# Patient Record
Sex: MALE | Race: WHITE | URBAN - NONMETROPOLITAN AREA
[De-identification: names, ages, dates, MRNs, and addresses within clinical notes are randomized per-mention and may not be internally consistent; named-entity substitution may affect disease eponyms.]

---

## 2021-05-24 PROBLEM — K21.9 GERD (GASTROESOPHAGEAL REFLUX DISEASE): Status: ACTIVE | Noted: 2021-05-24

## 2021-05-24 PROBLEM — E66.811 OBESITY, CLASS I, BMI 30-34.9: Status: ACTIVE | Noted: 2019-12-06

## 2021-05-24 PROBLEM — K57.92 DIVERTICULITIS: Status: ACTIVE | Noted: 2019-11-12

## 2021-05-24 PROBLEM — I10 ESSENTIAL HYPERTENSION: Status: ACTIVE | Noted: 2021-05-24

## 2021-05-24 PROBLEM — G43.909 MIGRAINES: Status: ACTIVE | Noted: 2021-05-24

## 2021-05-24 PROBLEM — K57.32 SIGMOID DIVERTICULITIS: Status: ACTIVE | Noted: 2019-12-06

## 2021-05-24 PROBLEM — E66.9 OBESITY, CLASS I, BMI 30-34.9: Status: ACTIVE | Noted: 2019-12-06

## 2021-05-24 RX ORDER — MULTIVIT-MIN/FOLIC/VIT K/LYCOP 400-300MCG
1 TABLET ORAL DAILY
COMMUNITY

## 2021-05-24 RX ORDER — OMEPRAZOLE 40 MG/1
CAPSULE, DELAYED RELEASE ORAL PRN
COMMUNITY
Start: 2021-04-09 | End: 2021-07-14

## 2021-05-24 RX ORDER — ACETAMINOPHEN 160 MG
4000 TABLET,DISINTEGRATING ORAL
COMMUNITY

## 2021-05-24 RX ORDER — CETIRIZINE HYDROCHLORIDE 10 MG/1
TABLET ORAL
COMMUNITY
Start: 2021-04-22

## 2021-05-24 RX ORDER — LISINOPRIL 20 MG/1
1 TABLET ORAL DAILY
COMMUNITY
Start: 2021-04-30

## 2021-05-24 RX ORDER — FLUTICASONE PROPIONATE 50 MCG
SPRAY, SUSPENSION (ML) NASAL
COMMUNITY
Start: 2019-08-05

## 2021-05-24 RX ORDER — PROPRANOLOL HYDROCHLORIDE 120 MG/1
1 CAPSULE, EXTENDED RELEASE ORAL
COMMUNITY
Start: 2021-05-04

## 2021-05-25 ENCOUNTER — OFFICE VISIT (OUTPATIENT)
Dept: CARDIOLOGY CLINIC | Age: 61
End: 2021-05-25
Payer: COMMERCIAL

## 2021-05-25 VITALS
DIASTOLIC BLOOD PRESSURE: 78 MMHG | RESPIRATION RATE: 16 BRPM | SYSTOLIC BLOOD PRESSURE: 132 MMHG | WEIGHT: 229 LBS | BODY MASS INDEX: 34.71 KG/M2 | HEIGHT: 68 IN | HEART RATE: 63 BPM

## 2021-05-25 DIAGNOSIS — R94.31 EKG ABNORMALITIES: ICD-10-CM

## 2021-05-25 DIAGNOSIS — I10 ESSENTIAL HYPERTENSION: Primary | ICD-10-CM

## 2021-05-25 PROCEDURE — 93000 ELECTROCARDIOGRAM COMPLETE: CPT | Performed by: INTERNAL MEDICINE

## 2021-05-25 PROCEDURE — 99204 OFFICE O/P NEW MOD 45 MIN: CPT | Performed by: INTERNAL MEDICINE

## 2021-05-25 RX ORDER — OFLOXACIN 3 MG/ML
SOLUTION/ DROPS OPHTHALMIC
COMMUNITY
Start: 2021-05-04 | End: 2021-05-25

## 2021-05-25 NOTE — PROGRESS NOTES
CHIEF COMPLAINT: Chest pain/Left arm pain    HISTORY OF PRESENT ILLNESS: Patient is a 63 y.o. male seen at the request of GRAY Fernandez NP. Patient present for evaluation of abnormal EKG. EKG with suggestion of prior anterior and inferior insults. Some left arm pain. Patient with history of HTN. State [place] but no overt angina. Past Medical History:   Diagnosis Date    Diverticulitis     Hypertension        Patient Active Problem List   Diagnosis    Diverticulitis    Essential hypertension    GERD (gastroesophageal reflux disease)    Migraines    Obesity, Class I, BMI 30-34.9    Sigmoid diverticulitis       No Known Allergies    Current Outpatient Medications   Medication Sig Dispense Refill    omeprazole (PRILOSEC) 40 MG delayed release capsule as needed       propranolol (INDERAL LA) 120 MG extended release capsule Take 1 capsule by mouth 1 TABLET DAILY BEFORE DINNER.  lisinopril (PRINIVIL;ZESTRIL) 20 MG tablet Take 1 tablet by mouth daily      cetirizine (ZYRTEC) 10 MG tablet TAKE ONE TABLET BY MOUTH ONCE A DAY AS NEEDED      fluticasone (FLONASE) 50 MCG/ACT nasal spray USE 1 SPRAY IN EACH NOSTRIL ONCE A DAY      Cholecalciferol (VITAMIN D3) 50 MCG (2000 UT) CAPS Take 4,000 Units by mouth      Multiple Vitamins-Minerals (ONE-A-DAY MENS HEALTH FORMULA) TABS Take 1 tablet by mouth daily       No current facility-administered medications for this visit.        Social History     Socioeconomic History    Marital status:      Spouse name: Not on file    Number of children: Not on file    Years of education: Not on file    Highest education level: Not on file   Occupational History    Not on file   Tobacco Use    Smoking status: Never Smoker    Smokeless tobacco: Current User     Types: Chew   Vaping Use    Vaping Use: Never used   Substance and Sexual Activity    Alcohol use: Yes     Comment: socially    Drug use: Never    Sexual activity: Not on file   Other Topics Concern    Not on file   Social History Narrative    Not on file     Social Determinants of Health     Financial Resource Strain:     Difficulty of Paying Living Expenses:    Food Insecurity:     Worried About Running Out of Food in the Last Year:     [address] in the Last Year:    Transportation Needs:     Lack of Transportation (Medical):  Lack of Transportation (Non-Medical):    Physical Activity:     Days of Exercise per Week:     Minutes of Exercise per Session:    Stress:     Feeling of Stress :    Social Connections:     Frequency of Communication with Friends and Family:     Frequency of Social Gatherings with Friends and Family:     Attends [employer] Services:     Active Member of Clubs or Organizations:     Attends Club or Organization Meetings:     Marital Status:    Intimate Partner Violence:     Fear of Current or Ex-Partner:     Emotionally Abused:     Physically Abused:     Sexually Abused:        Family History   Problem Relation Age of Onset    Diabetes Mother     Hypertension Father      Review of Systems:  Heart: as above   Lungs: as above   Eyes: denies changes in vision or discharge. Ears: denies changes in hearing or pain. Nose: denies epistaxis or masses   Throat: denies sore throat or trouble swallowing. Neuro: denies numbness, tingling, tremors. Skin: denies rashes or itching. : denies hematuria, dysuria   GI: denies vomiting, diarrhea   Psych: denies mood changed, anxiety, depression. All other systems negative. Physical Exam   /78   Pulse 63   Resp 16   Ht 5' 8\" (1.727 m)   Wt 229 lb (103.9 kg)   BMI 34.82 kg/m²   Constitutional: Oriented to person, place, and time. Well-developed and well-nourished. No distress. Head: Normocephalic and atraumatic. Eyes: EOM are normal. Pupils are equal, round, and reactive to light. Neck: Normal range of motion. Neck supple. No hepatojugular reflux and no JVD present.  Carotid bruit is not present. No tracheal deviation present. No thyromegaly present. Cardiovascular: Normal rate, regular rhythm, normal heart sounds and intact distal pulses. Exam reveals no gallop and no friction rub. No murmur heard. Pulmonary/Chest: Effort normal and breath sounds normal. No respiratory distress. No wheezes. No rales. No tenderness. Abdominal: Soft. Bowel sounds are normal. No distension and no mass. No tenderness. No rebound and no guarding. Musculoskeletal: Normal range of motion. No edema and no tenderness. Lymphadenopathy:   No cervical adenopathy. No groin adenopathy. Neurological: Alert and oriented to person, place, and time. Skin: Skin is warm and dry. No rash noted. Not diaphoretic. No erythema. Psychiatric: Normal mood and affect. Behavior is normal.     EKG personally reviewed 05/25/21:  normal sinus rhythm, nonspecific ST and T waves changes. ASSESSMENT AND PLAN:  Patient Active Problem List   Diagnosis    Diverticulitis    Essential hypertension    GERD (gastroesophageal reflux disease)    Migraines    Obesity, Class I, BMI 30-34.9    Sigmoid diverticulitis     1. Chest pain/Left Arm Pain:     Abnormal EKG suggesting prior inferior and anteroseptal insults. Echo. Stress. 2. HTN: Observe. Agnieszka Barrera D.O.   Cardiologist  Cardiology, [facility]

## 2021-06-09 LAB
LV EF: 63 %
LVEF MODALITY: NORMAL

## 2021-06-21 ENCOUNTER — TELEPHONE (OUTPATIENT)
Dept: CARDIOLOGY CLINIC | Age: 61
End: 2021-06-21

## 2021-06-21 NOTE — TELEPHONE ENCOUNTER
Resubmit as plain treadmill stress. Fabienne Celaya D.O.   Cardiologist  Cardiology, [id number]

## 2021-07-08 DIAGNOSIS — R94.31 EKG ABNORMALITIES: ICD-10-CM

## 2021-07-08 DIAGNOSIS — I10 ESSENTIAL HYPERTENSION: ICD-10-CM

## 2021-07-13 PROBLEM — G43.009 MIGRAINE WITHOUT AURA AND WITHOUT STATUS MIGRAINOSUS, NOT INTRACTABLE: Status: ACTIVE | Noted: 2019-07-17

## 2021-07-14 ENCOUNTER — OFFICE VISIT (OUTPATIENT)
Dept: CARDIOLOGY CLINIC | Age: 61
End: 2021-07-14
Payer: COMMERCIAL

## 2021-07-14 VITALS
WEIGHT: 229 LBS | RESPIRATION RATE: 18 BRPM | HEIGHT: 70 IN | DIASTOLIC BLOOD PRESSURE: 84 MMHG | SYSTOLIC BLOOD PRESSURE: 132 MMHG | HEART RATE: 59 BPM | BODY MASS INDEX: 32.78 KG/M2

## 2021-07-14 DIAGNOSIS — I10 ESSENTIAL HYPERTENSION: Primary | ICD-10-CM

## 2021-07-14 PROCEDURE — 93000 ELECTROCARDIOGRAM COMPLETE: CPT | Performed by: INTERNAL MEDICINE

## 2021-07-14 PROCEDURE — 99214 OFFICE O/P EST MOD 30 MIN: CPT | Performed by: INTERNAL MEDICINE

## 2021-07-14 RX ORDER — ASPIRIN 81 MG/1
81 TABLET ORAL DAILY
COMMUNITY

## 2021-07-14 NOTE — PROGRESS NOTES
CHIEF COMPLAINT: Chest pain/Left arm pain    HISTORY OF PRESENT ILLNESS: Patient is a 63 y.o. male seen at the request of GRAY Hendrickson NP. Patient present for evaluation of abnormal EKG. EKG with suggestion of prior anterior and inferior insults. Some left arm pain. Patient with history of HTN. No CP or SOB. Past Medical History:   Diagnosis Date    Diverticulitis     Hypertension        Patient Active Problem List   Diagnosis    Diverticulitis    Essential hypertension    GERD (gastroesophageal reflux disease)    Migraines    Obesity, Class I, BMI 30-34.9    Sigmoid diverticulitis    Migraine without aura and without status migrainosus, not intractable       No Known Allergies    Current Outpatient Medications   Medication Sig Dispense Refill    aspirin 81 MG EC tablet Take 81 mg by mouth daily      propranolol (INDERAL LA) 120 MG extended release capsule Take 1 capsule by mouth 1 TABLET DAILY BEFORE DINNER.  lisinopril (PRINIVIL;ZESTRIL) 20 MG tablet Take 1 tablet by mouth daily      cetirizine (ZYRTEC) 10 MG tablet TAKE ONE TABLET BY MOUTH ONCE A DAY AS NEEDED      fluticasone (FLONASE) 50 MCG/ACT nasal spray USE 1 SPRAY IN EACH NOSTRIL ONCE A DAY      Cholecalciferol (VITAMIN D3) 50 MCG (2000 UT) CAPS Take 4,000 Units by mouth      Multiple Vitamins-Minerals (ONE-A-DAY MENS HEALTH FORMULA) TABS Take 1 tablet by mouth daily       No current facility-administered medications for this visit.        Social History     Socioeconomic History    Marital status:      Spouse name: Not on file    Number of children: Not on file    Years of education: Not on file    Highest education level: Not on file   Occupational History    Not on file   Tobacco Use    Smoking status: Never Smoker    Smokeless tobacco: Current User     Types: Chew   Vaping Use    Vaping Use: Never used   Substance and Sexual Activity    Alcohol use: Yes     Comment: socially    Drug use: Never    Sexual activity: Not on file   Other Topics Concern    Not on file   Social History Narrative    Not on file     Social Determinants of Health     Financial Resource Strain:     Difficulty of Paying Living Expenses:    Food Insecurity:     Worried About Running Out of Food in the Last Year:     [address] in the Last Year:    Transportation Needs:     Lack of Transportation (Medical):  Lack of Transportation (Non-Medical):    Physical Activity:     Days of Exercise per Week:     Minutes of Exercise per Session:    Stress:     Feeling of Stress :    Social Connections:     Frequency of Communication with Friends and Family:     Frequency of Social Gatherings with Friends and Family:     Attends [employer] Services:     Active Member of Clubs or Organizations:     Attends Club or Organization Meetings:     Marital Status:    Intimate Partner Violence:     Fear of Current or Ex-Partner:     Emotionally Abused:     Physically Abused:     Sexually Abused:        Family History   Problem Relation Age of Onset    Diabetes Mother     Hypertension Father      Review of Systems:  Heart: as above   Lungs: as above   Eyes: denies changes in vision or discharge. Ears: denies changes in hearing or pain. Nose: denies epistaxis or masses   Throat: denies sore throat or trouble swallowing. Neuro: denies numbness, tingling, tremors. Skin: denies rashes or itching. : denies hematuria, dysuria   GI: denies vomiting, diarrhea   Psych: denies mood changed, anxiety, depression. All other systems negative. Physical Exam   /84   Pulse 59   Resp 18   Ht 5' 10\" (1.778 m)   Wt 229 lb (103.9 kg)   BMI 32.86 kg/m²   Constitutional: Oriented to person, place, and time. Well-developed and well-nourished. No distress. Head: Normocephalic and atraumatic. Eyes: EOM are normal. Pupils are equal, round, and reactive to light. Neck: Normal range of motion. Neck supple.  No hepatojugular reflux and no JVD present. Carotid bruit is not present. No tracheal deviation present. No thyromegaly present. Cardiovascular: Normal rate, regular rhythm, normal heart sounds and intact distal pulses. Exam reveals no gallop and no friction rub. No murmur heard. Pulmonary/Chest: Effort normal and breath sounds normal. No respiratory distress. No wheezes. No rales. No tenderness. Abdominal: Soft. Bowel sounds are normal. No distension and no mass. No tenderness. No rebound and no guarding. Musculoskeletal: Normal range of motion. No edema and no tenderness. Lymphadenopathy:   No cervical adenopathy. No groin adenopathy. Neurological: Alert and oriented to person, place, and time. Skin: Skin is warm and dry. No rash noted. Not diaphoretic. No erythema. Psychiatric: Normal mood and affect. Behavior is normal.     EKG personally reviewed 07/14/21:  normal sinus rhythm, nonspecific ST and T waves changes. Echo Conclusion 6/9/2021:       Bubble study is negative for right to left shunt. The left ventricle is normal size. There is normal LV segmental wall motion. LVEF is 60-65%. The left ventricular diastolic function is normal.       The right ventricle is normal size. No significant valve abnormalities. ASSESSMENT AND PLAN:  Patient Active Problem List   Diagnosis    Diverticulitis    Essential hypertension    GERD (gastroesophageal reflux disease)    Migraines    Obesity, Class I, BMI 30-34.9    Sigmoid diverticulitis    Migraine without aura and without status migrainosus, not intractable     1. Chest pain/Left Arm Pain:     Abnormal EKG suggesting prior inferior and anteroseptal insults. Echo normal 6/9/2021. Low risk ETT. 2. HTN: Observe. Frantz Gauthier D.O.   Cardiologist  Cardiology, [facility]

## 2025-07-21 LAB
ALBUMIN: 4.1 GM/DL (ref 3.4–5)
ALP BLD-CCNC: 107 U/L (ref 46–116)
ALT SERPL-CCNC: 27 U/L (ref 5–49)
AST SERPL-CCNC: 24 IU/L (ref 0–34)
BASOPHILS ABSOLUTE: 0.1 10*3/UL (ref 0–0.1)
BASOPHILS RELATIVE PERCENT: 0.7 % (ref 0–1)
BILIRUB SERPL-MCNC: 0.6 MG/DL (ref 0.3–1.2)
BUN BLDV-MCNC: 11 MG/DL (ref 9–23)
CALCIUM SERPL-MCNC: 9.6 MD/DL (ref 8.7–10.4)
CHLORIDE BLD-SCNC: 106 MMOL/L (ref 98–107)
CHOLESTEROL, TOTAL: 152 MG/DL
CO2: 25 MMOL/L (ref 20–31)
CREAT SERPL-MCNC: 0.85 MG/DL (ref 0.7–1.3)
EOSINOPHILS ABSOLUTE: 0.2 10*3/UL (ref 0–0.4)
EOSINOPHILS RELATIVE PERCENT: 2.2 % (ref 1–4)
GFR AFRICAN AMERICAN: > 60 ML/MIN
GFR, ESTIMATED: > 60 ML/MIN
GLUCOSE: 90 MG/DL (ref 65–99)
HCT VFR BLD CALC: 48.2 % (ref 42–52)
HDLC SERPL-MCNC: 54 MG/DL (ref 40–60)
HEMOGLOBIN: 16.4 G/DL (ref 14–18)
IMMATURE GRANULOCYTES #: 0 10*3/UL (ref 0–0.1)
IMMATURE GRANULOCYTES %: 0.4 % (ref 0–1)
LDL CHOLESTEROL: 73 MG/DL (ref 9–159)
LYMPHOCYTES ABSOLUTE: 1.6 10*3/UL (ref 1.3–4.4)
LYMPHOCYTES RELATIVE PERCENT: 22.3 % (ref 27–41)
MCH RBC QN AUTO: 31.1 PG (ref 27–31)
MCHC RBC AUTO-ENTMCNC: 34 G/DL (ref 33–37)
MCV RBC AUTO: 91.3 FL (ref 80–94)
MONOCYTES RELATIVE PERCENT: 0.9 10*3/UL (ref 0.1–1)
MONOCYTES RELATIVE PERCENT: 12.4 % (ref 3–9)
NEUTROPHILS ABSOLUTE: 4.5 10*3/UL (ref 2.3–7.9)
NEUTROPHILS RELATIVE PERCENT: 62 % (ref 47–73)
NUCLEATED RED BLOOD CELLS: 0 % (ref 0–0)
PDW BLD-RTO: 12.8 % (ref 0–14.5)
PLATELET # BLD: 261 10*3/UL (ref 130–400)
PMV BLD AUTO: 11.3 FL (ref 9.6–12.3)
POTASSIUM SERPL-SCNC: 4 MMOL/L (ref 3.4–5.1)
RBC # BLD: 5.28 10*6/UL (ref 4.5–5.9)
SODIUM BLD-SCNC: 139 MMOL/L (ref 136–145)
TOTAL PROTEIN: 7.2 GM/DL (ref 6–8)
TRIGL SERPL-MCNC: 123 MG/DL
VLDLC SERPL CALC-MCNC: 25 MG/DL (ref 6–40)
WBC # BLD: 7.3 10*3/UL (ref 4.8–10.8)